# Patient Record
Sex: FEMALE | Race: WHITE | Employment: OTHER | URBAN - METROPOLITAN AREA
[De-identification: names, ages, dates, MRNs, and addresses within clinical notes are randomized per-mention and may not be internally consistent; named-entity substitution may affect disease eponyms.]

---

## 2018-10-29 PROBLEM — N95.2 VAGINAL ATROPHY: Status: ACTIVE | Noted: 2018-10-29

## 2018-10-29 PROBLEM — N36.2 URETHRAL CARUNCLE: Status: ACTIVE | Noted: 2018-10-29

## 2020-03-18 PROBLEM — R00.2 PALPITATIONS: Status: ACTIVE | Noted: 2020-03-18

## 2022-03-19 PROBLEM — N95.2 VAGINAL ATROPHY: Status: ACTIVE | Noted: 2018-10-29

## 2022-03-19 PROBLEM — N36.2 URETHRAL CARUNCLE: Status: ACTIVE | Noted: 2018-10-29

## 2022-03-19 PROBLEM — R00.2 PALPITATIONS: Status: ACTIVE | Noted: 2020-03-18

## 2022-03-24 ENCOUNTER — HOSPITAL ENCOUNTER (OUTPATIENT)
Dept: PHYSICAL THERAPY | Age: 77
Discharge: HOME | End: 2022-03-24
Attending: NURSE PRACTITIONER
Payer: MEDICARE

## 2022-03-24 DIAGNOSIS — N81.6 RECTOCELE: ICD-10-CM

## 2022-03-24 DIAGNOSIS — N81.10 FEMALE CYSTOCELE: ICD-10-CM

## 2022-03-24 PROCEDURE — 97162 PT EVAL MOD COMPLEX 30 MIN: CPT

## 2022-03-24 PROCEDURE — 97530 THERAPEUTIC ACTIVITIES: CPT

## 2022-03-24 PROCEDURE — 97110 THERAPEUTIC EXERCISES: CPT

## 2022-03-24 NOTE — PROGRESS NOTES
Lisa Burk  : 1945  [place]  [place]: [place] [address] at [place] [id number] [facility], [address] [place] [address],[address], [address] [facility]  [phone]   [phone]         OUTPATIENT PHYSICAL THERAPY: Daily Treatment Note 3/24/2022   Visit Count:  1  ICD-10: Treatment Diagnosis: Lack of coordination (muscle incoordination) (R27.8), Pelvic Muscle Wasting (N81.84), Cystocele, unspecified (Prolapse of (anterior) vaginal wall NOS) (N81.10) and Rectocele (Prolapse of posterior vaginal wall) (N81.6)  Precautions/Allergies:   Demerol [meperidine]   TREATMENT PLAN:  Effective Dates: 3/24/2022 TO 2022 (90 days). Frequency/Duration: 1 time a week for 90 Day(s)  Pre-treatment Symptoms/Complaints:    1. Rectocele > Cystocele - UFI, UUI  2. Right sided lower abdominal discomfort  3. Left posterior hip pain (intermittent). Previously treated. Pain: Initial:   3/10 Post Session:  3/10   Medications Last Reviewed:  3/24/2022  Updated Objective Findings:  See evaluation note from today   TREATMENT:   THERAPEUTIC EXERCISE: (10 minutes):  Exercises per grid below to improve strength and coordination. Required minimal verbal cues to promote proper body mechanics and promote proper body breathing techniques. Progressed repetitions and complexity of movement as indicated.   TE= therapeutic exercise, TA= therapeutic activity, MT= manual therapy, NE= neuro re-education   Date:  3/24/22 Date:   Date:     Activity/Exercise Parameters Parameters Parameters   Kegals 3x/day x 10 reps  3 sec holds in SUPINE     Pelvic brace 3x/day x 10   Supine             THERAPEUTIC ACTIVITY: (20minutes): Functional activity education regarding anatomy, pathology and role of pelvic floor muscle (PFM) function in relation to presenting symptoms and role of pelvic floor therapy in conservative treatment., Extensive functional activity training on avoiding valsava/breath holding during strenuous activities as well as abdominal/pelvic bracing to provide muscular support and decrease symptoms of prolapse during lifting, sit to stand, getting in/out of car as well as log roll technique to decrease intraabdominal pressure. and Instruction on coordinated pelvic floor and diaphragmatic breathing to improve kinesthetic awareness of pelvic muscle mobility and restore proper motor recruitment patterns with breathing, posture, and functional movement (e.g. appropriate lift/contraction with increased IAP such as a cough, laugh, sneeze to prevent incontinence as well as appropriate relaxation/drop to reduce pain with vaginal penetration). TA Educational Topic Notes Date Completed   Pathology/Anatomy/PFM Function Completed 3/24/22   Bladder health education     Urinary urge suppression     The joaquim     Voiding strategies     Bowel/Bladder log     Bowel health education     Constipation care     Diarrhea/Fecal leakage     Colonic massage     Toilet positioning     Defecation dynamics     Sources of fiber     Return to intercourse/Dilator training     Sexual positioning     Lubricants/vaginal moisturizers     Vaginal irritants     Body mechanics     Posture/ergonomics     Diaphragmatic breathing     Resources and technology       Pt gives verbal consent to internal vaginal assessment/treatment without chaperon present. Treatment/Session Summary:    · Response to Treatment:  Pt reports good understanding of plan of care, as well as prescribed home exercise program.  All questions were answered to pt's satisfaction. Pt was invited to call with any further questions or concerns. · Communication/Consultation:  None today  · Equipment provided today:  None today  · Recommendations/Intent for next treatment session: Next visit will focus on   1. Review pelvic brace - abdominal recruitment strategy        2. Review kegals with sEMG biofeedback. 3 sec holds. Add quick flicks to HEP (continue in supine).         3. Bowel/bladder health, edu re toileting position, bladder irritants        4. Hip strength and ROM screen. · Variation from POC: N/A  Total Treatment Billable Duration: Evaluation 30 minutes, treatment 30 minutes     Steven Quezada, PT     No future appointments.

## 2022-03-24 NOTE — THERAPY EVALUATION
Lakhwinder Buchanan  : 1945  [place]  [place] [address] at [facility] [place] [address] [facility] [address], [address], [address] [facility]  [phone]   [phone]        OUTPATIENT PHYSICAL THERAPY:Initial Assessment 3/24/2022   ICD-10: Treatment Diagnosis: Lack of coordination (muscle incoordination) (R27.8), Pelvic Muscle Wasting (N81.84), Cystocele, unspecified (Prolapse of (anterior) vaginal wall NOS) (N81.10) and Rectocele (Prolapse of posterior vaginal wall) (N81.6)  Precautions/Allergies:   Demerol [meperidine]   TREATMENT PLAN:  Effective Dates: 3/24/2022 TO 2022 (90 days). Frequency/Duration: 1 time a week for 90 Day(s) MEDICAL/REFERRING DIAGNOSIS:  Rectocele [N81.6]  Female cystocele [N81.10]   DATE OF ONSET: Unknown  REFERRING PHYSICIAN: Yandel Morataya NP MD Orders: Evaluate and treat  Return MD Appointment:      INITIAL ASSESSMENT: Lakhwinder Buchanan presents with musculoskeletal limitations including altered body mechanics, reduced coordination and awareness of PFM, poor abdominal strength and decreased pelvic floor muscle (PFM) strength. These limitations are impairing the patient's ability to properly coordinate perineal elevation and descent for normalized PFM function, contributing to urinary dysfunction including Urge Urinary Incontinence (UUI) and bowel dysfunction including urge Fecal Incontinence (FI), as we as lower abdominal pain. As a result, she is limited in her/his ability to participate in physical activity, complete household chores, and participate in social activities outside of the home without fear of FI, UUI, or abdominal discomfort related to the prolapse. PROBLEM LIST (Impacting functional limitations):  1. Decreased Strength  2. Decreased ADL/Functional Activities  3. Increased Pain  4. Decreased Activity Tolerance  5. Decreased [place] with Home Exercise Program  6.  Decreased strength of pelvic floor which limits bladder control INTERVENTIONS PLANNED: (Treatment may consist of any combination of the following)  1. Neuromuscular re-education  2. Biofeedback as needed  3. HEP  4. Bladder retraining  5. Bladder education  6. Manual Therapy  7. Range of Motion (ROM)  8. Therapeutic Activites  9. Therapeutic Exercise/Strengthening     GOALS: (Goals have been discussed and agreed upon with patient.)  Short-Term Functional Goals: Time Frame:4-6 weeks  1. Patient will demonstrate independence with basic pelvic floor muscle (PFM) home exercises program (HEP) to improve awareness, coordination, and timing of PFM. 2. Patient will demonstrate understanding of and ability to teach back appropriate water intake, bladder irritants, toileting frequency, and positioning for improved self-management of symptoms. 3. Patient will demonstrate ability to isolate a pelvic floor contraction without breath holding and minimal to no accessory muscle use in order to implement the knack and/or urge suppression, reducing pad usage by 100%. Discharge Goals: Time Frame: 8 weeks  1. Patient will demonstrate ability to increase intra-abdominal pressure and eccentrically contract the pelvic floor muscles without breath holding, as assessed by digitally or with use of sEMG biofeedback, in order to improve bowel evacuation. 2. Patient will report a reduction in episodes of FI by 100%. 3. Patient will improve outcome score by >12%.   4. Patient will be able to perform lifting, bending, transitioning without urinary leakage for improved participation in recreational activities and ADL's.     OUTCOME MEASURE:   Tool Used: Pelvic Floor Impact Questionnaireshort form 7 (PFIQ-7)   Score:  Initial: 3/24/22  · Bladder or Urine: 0/100  · Bowel or Rectum: 29/100  · Vagina or Pelvis: 0/100 Most Recent: X (Date: -- )  · Bladder or Urine: X  · Bowel or Rectum: X  · Vagina or Pelvis: X   Interpretation of Score: Each of the 7 sections is scored on a scale from 0-3; 0 representing \"Not at all\", 3 representing \"Quite a bit\". The mean value is taken from all the answered items, then multiplied by 100 to obtain the scale score, ranging from 0-100. This process is repeated for each column representing bowel, bladder, and pelvic pain. MEDICAL NECESSITY:   · Patient is expected to demonstrate progress in strength, coordination and functional technique to assist in prolapse management and minimize UI and FI. Clotgerardo Gray REASON FOR SERVICES/OTHER COMMENTS:  · Patient continues to require skilled intervention due to the above mentioned deficits. Total Duration: 60 minutes  PT Patient Time In/Time Out  Time In: 0900  Time Out: 1000    Rehabilitation Potential For Stated Goals: Excellent  Regarding Priscila Flannery's therapy, I certify that the treatment plan above will be carried out by a therapist or under their direction. Thank you for this referral,  Gumaro Sears PT     Referring Physician Signature: Alpesh Gibbs NP _______________________________ Date _____________     PAIN/SUBJECTIVE:   Initial: Pain Intensity 1: 3 3/10 Post Session:  3/10   HISTORY:   History of Injury/Illness (Reason for Referral):  Ms. Verito Tuttle is a 77 yo female referred to pelvic floor physical therapy (PFPT) by Alpesh Gibbs NP 2/2 due to rectocele and cystocele. Pt noticed prolapse with insertion of estrace she felt some resistance present. Pt decided to see the gynecologist due to abdominal pain. She reports the abdominal pain has been present for several months. Pain is described as dull ache and pressure. Pain is greatest on her right side. Pain is intermittent. She thinks it may be associated with constipation or need to have a bowel movement, but is not certain there is a direct relationship. Pt denies LBP unless she \"overexercsises\" with gardening. Pt also reports right inguinal pain. States she notices catch with standing-up and she has difficulty walking following.      Pt has osteoporosis. Urinary: Frequency 5-8 x/day, 1 x/night. Positive for urge urinary incontinence. Altered urinary stream due to urethra caruncle. Negative for stress urinary incontinence. Pt does use pads for Bowel protection if needed. Fluid intake:  3-4 glasses oz water/day; bladder irritants include: unsweet tea (2), hot tea. Pt does not limit fluid intake due to bladder control. Bowel: Frequency 3x. Positive for intermttent straining to have BM. This is rare now. Positive for FI. States she has had loose stools for several years. Pt does use pads for bowel protection; 1 pads per day (PPD) when in public or traveling due to uncertainty of ability to control BM's. [place] stool type: 2, 4 (variable). Use of stool softeners or laxatives? NO    Sexual: Pt is sexually active with male partners. negative for dyspareunia. Denies pain with sexual intercourse. OB History: Number of pregnancies: 3 Number of vaginal deliveries: 3. Pt had an episiotomy that extended to the rectum. States this took a long time to heal and since she has felt her bowel control has been less. Patient stated goals for PT:  Patients goal(s) for PT are \"prevent further prolapse\"    Past Medical History/Comorbidities:   Ms. Debora Nielsen  has a past medical history of High cholesterol, Hypertension, and Osteoporosis. Ms. Debora Nielsen  has a past surgical history that includes hx hysteroscopy with endometrial ablation (1998); hx myomectomy (1998); hx wisdom teeth extraction; hx carpal tunnel release (2008); and hx tubal ligation. Social History/Living Environment:   Have you ever had any pelvic trauma (orthopedic in nature, fall, MVA, etc.)? NO   Have you ever experienced any unwanted physical or sexual contact? NO   Have you ever experienced any form of medical trauma (GYN, urological, GI, etc)? NO     Pt lives with . Alcohol use? rare  Tobacco use?  Non-smoker    Prior Level of Function/Work/Activity:  Prior level of function: WFL  Occupation: Retired  Exercise activities: Walking 1 hour every morning. No current strengthening program.     Personal Factors:          Age:  78 y.o. Chronic left hip pain      Risk Factors:       No Risk Factors Identified       (5)  History of Recent Falls [w/in 3 months] Ability to Rise from Chair:       (0)  Ability to rise in a single movement   Falls Prevention Plan:       No modifications necessary   Total: (5 or greater = High Risk): 1   ©2010 [facility] [employer]. All Rights Reserved. [facility] Patent #[id number]. Federal Law prohibits the replication, distribution or use without written permission from [facility] [employer]   Current Medications:       Current Outpatient Medications:     estradioL (ESTRACE) 0.01 % (0.1 mg/gram) vaginal cream, Apply 1 g to affected area every Monday, Wednesday, Friday., Disp: 42.5 g, Rfl: 12    metoprolol succinate (TOPROL-XL) 50 mg XL tablet, Take 1 Tablet by mouth daily. , Disp: 90 Tablet, Rfl: 3    FLUoxetine (PROZAC) 10 mg capsule, Take 10 mg by mouth daily. , Disp: , Rfl:     simvastatin (ZOCOR) 10 mg tablet, Take  by mouth nightly., Disp: , Rfl:     aspirin 81 mg chewable tablet, Take 81 mg by mouth daily. , Disp: , Rfl:     cholecalciferol, vitamin D3, (VITAMIN D3) 2,000 unit tab, Take  by mouth., Disp: , Rfl:    Date Last Reviewed: 3/24/2022   Number of Personal Factors/Comorbidities that affect the Plan of Care: 1-2: MODERATE COMPLEXITY   EXAMINATION:   External Observations:   Voluntary contraction: [] absent     [x] present   Involuntary contraction: [] absent     [x] present   Involuntary relaxation: [] absent     [x] present   Perineal descent at rest: [x] absent     [] present   Perineal descent with bearing: [] absent     [x] present, to level of hymen (assessed in supine)    Pelvic Floor Muscle (PFM) Assessment:   Vaginal vault size: [] decreased     [] increased     [x] WNL   Muscle volume: [] decreased     [] WNL [x] Defect   PFM tension: [x] decreased     [] increased     [] WNL    Contraction ability:  Voluntary contraction: [] absent     [x] weak     [] moderate      [] strong  Voluntary relaxation: [] absent     [x] partial     [] complete   MMT: 2/5   PFM endurance: 5 seconds  Repetitions of endurance contractions: 3  Number of quick contractions in 10 seconds: 5  Quality of contractions: Fair    Tissue laxity test:  Anterior wall: [x] minimum     [] moderate     [] severe      [] WNL  Posterior wall: [x] minimum     [] moderate     [] severe      [] WNL    Palpation: via vaginal canal   Superficial Pelvic Floor Musculature (PFM): Tender? Intermediate PFM Tender? Deep PFM Tender? Superficial Transverse Perineal [] Right  [] Left  []DNT Deep Transverse Perineal [] Right  [] Left  []DNT Puborectalis [] Right  [] Left  []DNT   Ischiocavernosus [] Right  [] Left  []DNT Compressor Urethra [] Right  [] Left  []DNT Pubococcygeus [] Right  [] Left  []DNT   Bulbocavernosus [] Right  [] Left  []DNT   Iliococcygeus [] Right  [] Left  []DNT       Obturator Internus [x] Right  [x] Left  []DNT       Coccygeus [] Right  [] Left  []DNT     Hip Strength: To be assessed at follow-up    Range of motion:   Left Right   Hip flexion     Hip extension      Knee flexion      Knee extension     Hip internal rotation      Hip external rotation      Hip abduction     Hip adduction          External palpation:  Muscle/muscle group Tender? Adductors [] Right  [] Left  []DNT   Gluteals [] Right  [] Left  []DNT   Piriformis [] Right  [] Left  []DNT   Iliopsoas [] Right  [] Left  []DNT   Abdominal wall [] Right  [x] Left  []DNT   Pubic symphysis [] Right  [] Left  []DNT     Breath assessment:   Observation: chest breathing dominant  Coordination of pelvic floor muscles with breath: minimal pelvic floor muscle excursion  Co-contraction of PFM with transversus abdominis: absent. Excessive use of RA, resulting in increase in IAP.  Pt unable to maintain PFM contraction with this recruitment strategy. Body Structures Involved:  1. Nerves  2. Joints  3. Muscles Body Functions Affected:  1. Sensory/Pain  2. Genitourinary  3. Neuromusculoskeletal  4. Movement Related Activities and Participation Affected:  1. Self Care  2.  Domestic Life   Number of elements (examined above) that affect the Plan of Care: 3: MODERATE COMPLEXITY   CLINICAL PRESENTATION:   Presentation: Evolving clinical presentation with changing clinical characteristics: MODERATE COMPLEXITY   CLINICAL DECISION MAKING:   Use of outcome tool(s) and clinical judgement create a POC that gives a: Questionable prediction of patient's progress: MODERATE COMPLEXITY     Eladio Dior PT, DPT, [facility]

## 2022-04-04 ENCOUNTER — HOSPITAL ENCOUNTER (OUTPATIENT)
Dept: PHYSICAL THERAPY | Age: 77
Discharge: HOME | End: 2022-04-04
Attending: NURSE PRACTITIONER
Payer: MEDICARE

## 2022-04-04 PROCEDURE — 97110 THERAPEUTIC EXERCISES: CPT

## 2022-04-04 PROCEDURE — 97530 THERAPEUTIC ACTIVITIES: CPT

## 2022-04-04 NOTE — PROGRESS NOTES
Colen Shone  : 1945  [place]  [place]: [place] [address] at [place] [id number] [facility], [address] [place] [address],[address], [address]  [phone]   [phone]         OUTPATIENT PHYSICAL THERAPY: Daily Treatment Note 2022   Visit Count:  2  ICD-10: Treatment Diagnosis: Lack of coordination (muscle incoordination) (R27.8), Pelvic Muscle Wasting (N81.84), Cystocele, unspecified (Prolapse of (anterior) vaginal wall NOS) (N81.10) and Rectocele (Prolapse of posterior vaginal wall) (N81.6)  Precautions/Allergies:   Demerol [meperidine]   TREATMENT PLAN:  Effective Dates: 3/24/2022 TO 2022 (90 days). Frequency/Duration: 1 time a week for 90 Day(s)  Pre-treatment Symptoms/Complaints:    1. Rectocele > Cystocele - UFI, UUI  2. Right sided lower abdominal discomfort  3. Left posterior hip pain (intermittent). Previously treated. Hip: Pt experiencing left posterior hip pain (piriformis). States she used a tennis ball to roll this out and it helped. Less pain today. Abdominal: Pt will see GI MD on Friday. Denies pelvic heaviness/pressure. Pain: Initial:   0/10 Post Session:  0/10   Medications Last Reviewed:  2022  Updated Objective Findings:    sEMG biofeedback:  Side-lying resting PFM activity initially at 5-7 mV with max PFM recruitment at 50 mV. Resting reduced to 3 mV with cues to relax gluteals and abdominals. Quick flicks well coordinated to 40 mV. No delay in relaxation between contractions. Max hold time 3-4 seconds. TREATMENT:   THERAPEUTIC EXERCISE: (30 minutes):  Exercises per grid below to improve strength and coordination. Required minimal verbal cues to promote proper body mechanics and promote proper body breathing techniques. Progressed repetitions and complexity of movement as indicated.   TE= therapeutic exercise, TA= therapeutic activity, MT= manual therapy, NE= neuro re-education   Date:  3/24/22 Date:  4/4/22 Date:     Activity/Exercise Parameters Parameters Parameters   Kegals 3x/day x 10 reps  3 sec holds in SUPINE With sEMG biofeedback:    QF's 4 x 5 reps  Holds 3-4 seconds    Pelvic brace 3x/day x 10   Supine X 10 in supine, hook-lying  X 5 with march  X 5 with march + hip adduction    PF drops   x 10 with sEMG biofeedback    HEP  Updated to add 4x5 QF and Brace + march in place      THERAPEUTIC ACTIVITY: (15 minutes): Functional activity education regarding anatomy, pathology and role of pelvic floor muscle (PFM) function in relation to presenting symptoms and role of pelvic floor therapy in conservative treatment., Extensive functional activity training on avoiding valsava/breath holding during strenuous activities as well as abdominal/pelvic bracing to provide muscular support and decrease symptoms of prolapse during lifting, sit to stand, getting in/out of car as well as log roll technique to decrease intraabdominal pressure. and Instruction on coordinated pelvic floor and diaphragmatic breathing to improve kinesthetic awareness of pelvic muscle mobility and restore proper motor recruitment patterns with breathing, posture, and functional movement (e.g. appropriate lift/contraction with increased IAP such as a cough, laugh, sneeze to prevent incontinence as well as appropriate relaxation/drop to reduce pain with vaginal penetration).      TA Educational Topic Notes Date Completed   Pathology/Anatomy/PFM Function Completed 3/24/22   Bladder health education     Urinary urge suppression Urge and bowel suppression strategies 4/4/22   The knack     Voiding strategies     Bowel/Bladder log     Bowel health education     Constipation care     Diarrhea/Fecal leakage     Colonic massage     Toilet positioning     Defecation dynamics     Sources of fiber     Return to intercourse/Dilator training     Sexual positioning     Lubricants/vaginal moisturizers     Vaginal irritants     Body mechanics Body mechanics, coordination of PF, TrA, and diaphragm 4/4/22   Posture/ergonomics     Diaphragmatic breathing     Resources and technology       Pt gives verbal consent to internal vaginal assessment/treatment without chaperon present. Treatment/Session Summary:    · Response to Treatment:  Pt reports good understanding of plan of care, as well as prescribed home exercise program.  All questions were answered to pt's satisfaction. Pt was invited to call with any further questions or concerns. · Communication/Consultation:  None today  · Equipment provided today:  None today  · Recommendations/Intent for next treatment session: Next visit will focus on manual therapy left piriformis. 1. Review pelvic brace - abdominal recruitment strategy - progress to seated, standing        2. Review kegals with sEMG biofeedback. 3 sec holds - progress to 5 sec holds?, QF in seated, standing        3. Bowel/bladder health, edu re toileting position, bladder irritants        4. Hip strength and ROM screen.    · Variation from POC: N/A  Total Treatment Billable Duration: Treatment minutes [address], PT     Future Appointments   Date Time Provider Samuel Alvarez   4/11/2022  9:00 AM Yusef Jensen PT [facility] KALPANA   4/19/2022  2:00 PM Yusef Jensen PT [facility] SFLEILA   4/25/2022  1:00 PM Yusef Jensen PT CARA ACUNA

## 2022-04-12 ENCOUNTER — HOSPITAL ENCOUNTER (OUTPATIENT)
Dept: PHYSICAL THERAPY | Age: 77
Discharge: HOME | End: 2022-04-12
Attending: NURSE PRACTITIONER
Payer: MEDICARE

## 2022-04-12 PROCEDURE — 97110 THERAPEUTIC EXERCISES: CPT

## 2022-04-12 PROCEDURE — 97140 MANUAL THERAPY 1/> REGIONS: CPT

## 2022-04-12 NOTE — PROGRESS NOTES
Charo Glasgow  : 1945  [place]  [place]: [place] [address] at [place] [address] [facility], [address] [place] [address],[address], [address]  [phone]   [phone]         OUTPATIENT PHYSICAL THERAPY: Daily Treatment Note 2022   Visit Count:  3  ICD-10: Treatment Diagnosis: Lack of coordination (muscle incoordination) (R27.8), Pelvic Muscle Wasting (N81.84), Cystocele, unspecified (Prolapse of (anterior) vaginal wall NOS) (N81.10) and Rectocele (Prolapse of posterior vaginal wall) (N81.6)  Precautions/Allergies:   Demerol [meperidine]   TREATMENT PLAN:  Effective Dates: 3/24/2022 TO 2022 (90 days). Frequency/Duration: 1 time a week for 90 Day(s)  Pre-treatment Symptoms/Complaints:    1. Rectocele > Cystocele - UFI, UUI  2. Right sided lower abdominal discomfort  3. Left posterior hip pain (intermittent). Previously treated. Pt saw GI last week. She has an appointment for a colonoscopy and Endoscopy. He would like her to follow an IBS specific diet. Avoiding raw vegetables. Abdominal pain: less, not as frequent. No issues with bowel movements. Left hip pain: no pain today. Pt states if she walks daily her hip pain is less. Sometimes she notices breath holding with completion of exercises. Pain: Initial:   0/10 Post Session:  0/10   Medications Last Reviewed:  2022  Updated Objective Findings:    sEMG biofeedback:  Side-lying resting PFM activity initially at 5-7 mV with max PFM recruitment at 50 mV. Resting reduced to 3 mV with cues to relax gluteals and abdominals. Quick flicks well coordinated to 40 mV in supine. Quick flicks to <1WE in seated position. No delay in relaxation between contractions. Max hold time 5-7 seconds. TREATMENT:   THERAPEUTIC EXERCISE: (45 minutes):  Exercises per grid below to improve strength and coordination.   Required minimal verbal cues to promote proper body mechanics and promote proper body breathing techniques. Progressed repetitions and complexity of movement as indicated. TE= therapeutic exercise, TA= therapeutic activity, MT= manual therapy, NE= neuro re-education   Date:  3/24/22 Date:  4/4/22 Date:  4/12/22   Activity/Exercise Parameters Parameters Parameters   Kegals 3x/day x 10 reps  3 sec holds in SUPINE With sEMG biofeedback:    QF's 4 x 5 reps  Holds 3-4 seconds With sEMG biofeedback:    QF's 4 x 5 reps in side-lying and seated. Holds 3 seconds x 10, 5 seconds x 10   Pelvic brace 3x/day x 10   Supine X 10 in supine, hook-lying  X 5 with march  X 5 with march + hip adduction 2 X 10 in supine, hook-lying  + hip adduction ( BLOCK) up/up/down/down     PF drops   x 10 with sEMG biofeedback  x 10 with sEMG biofeedback   HEP  Updated to add 4x5 QF and Brace + march in place Progressed to QF in seated position, 5 sec PF holds in supine/side-lying   Side-lying hip abduction   2 x 10 against wall     Access Code: [email or web address]  URL: [email or web address]  Date: 04/12/2022  Prepared by: [facility] OF [facility]    Exercises  Sidelying Hip Abduction at [employer] - 1 x daily - 7 x weekly - 2 sets - 10 reps  Supine March with Elevation - 1 x daily - 7 x weekly - 2 sets - 10 reps    THERAPEUTIC ACTIVITY: ( minutes): Functional activity education regarding anatomy, pathology and role of pelvic floor muscle (PFM) function in relation to presenting symptoms and role of pelvic floor therapy in conservative treatment., Extensive functional activity training on avoiding valsava/breath holding during strenuous activities as well as abdominal/pelvic bracing to provide muscular support and decrease symptoms of prolapse during lifting, sit to stand, getting in/out of car as well as log roll technique to decrease intraabdominal pressure.  and Instruction on coordinated pelvic floor and diaphragmatic breathing to improve kinesthetic awareness of pelvic muscle mobility and restore proper motor recruitment patterns with breathing, posture, and functional movement (e.g. appropriate lift/contraction with increased IAP such as a cough, laugh, sneeze to prevent incontinence as well as appropriate relaxation/drop to reduce pain with vaginal penetration). TA Educational Topic Notes Date Completed   Pathology/Anatomy/PFM Function Completed 3/24/22   Bladder health education     Urinary urge suppression Urge and bowel suppression strategies 4/4/22   The knack     Voiding strategies     Bowel/Bladder log     Bowel health education     Constipation care     Diarrhea/Fecal leakage     Colonic massage     Toilet positioning     Defecation dynamics     Sources of fiber     Return to intercourse/Dilator training     Sexual positioning     Lubricants/vaginal moisturizers     Vaginal irritants     Body mechanics Body mechanics, coordination of PF, TrA, and diaphragm 4/4/22   Posture/ergonomics     Diaphragmatic breathing     Resources and technology       Pt gives verbal consent to internal vaginal assessment/treatment without chaperon present. Did not complete today. MANUAL THERAPY (10 minutes): STM lateral sacral border to lateral greater trochanter. Piriformis release. Treatment/Session Summary:    · Response to Treatment: Pt showing improved ability to hold PFM contraction. Pt exhibited difficulty with maintaining TrA activation during supine march as she exhibited pelvic rotation and lumbar extension. Mild discomfort with palpation at left lateral border of sacrum. · Communication/Consultation:  None today  · Equipment provided today:  None today  · Recommendations/Intent for next treatment session: Next visit will focus on manual therapy left piriformis. 1. Review TrA activation in supine with march/block, progress to quadruped        2. Review kegals with sEMG biofeedback. 3-5 sec holds in seated, standing, review QF in seated, standing        3.  Bowel/bladder health, edu re toileting position, bladder irritants 4. Hip strength and ROM screen.     · Variation from POC: N/A  Total Treatment Billable Duration: Treatment minutes 269 [facility], PT     Future Appointments   Date Time Provider Samuel Alvarez   4/19/2022  2:00 PM Mau Mccord [facility] [place]   4/25/2022  1:00 PM Mau Mccord, PT [facility]

## 2022-04-18 ENCOUNTER — APPOINTMENT (OUTPATIENT)
Dept: PHYSICAL THERAPY | Age: 77
End: 2022-04-18
Attending: NURSE PRACTITIONER
Payer: MEDICARE

## 2022-04-19 ENCOUNTER — HOSPITAL ENCOUNTER (OUTPATIENT)
Dept: PHYSICAL THERAPY | Age: 77
Discharge: HOME | End: 2022-04-19
Attending: NURSE PRACTITIONER
Payer: MEDICARE

## 2022-04-19 PROCEDURE — 97110 THERAPEUTIC EXERCISES: CPT

## 2022-04-19 NOTE — PROGRESS NOTES
Bacilio Rosenberg  : 1945  [place]  [place]: [place] [address] at [address], [address] [place] [address],[address], [address] [facility]  [phone]   [phone]         OUTPATIENT PHYSICAL THERAPY: Daily Treatment Note 2022   Visit Count:  4  ICD-10: Treatment Diagnosis: Lack of coordination (muscle incoordination) (R27.8), Pelvic Muscle Wasting (N81.84), Cystocele, unspecified (Prolapse of (anterior) vaginal wall NOS) (N81.10) and Rectocele (Prolapse of posterior vaginal wall) (N81.6)  Precautions/Allergies:   Demerol [meperidine]   TREATMENT PLAN:  Effective Dates: 3/24/2022 TO 2022 (90 days). Frequency/Duration: 1 time a week for 90 Day(s)  Pre-treatment Symptoms/Complaints:    1. Rectocele > Cystocele - UFI, UUI  2. Right sided lower abdominal discomfort  3. Left posterior hip pain (intermittent). Previously treated. Pt states she did well in the car yesterday. Left sided pelvic girdle pain rated as 3/10 currently. Denies abdominal pain currently. No report of incontinence    Less consistent with her exercises in last week. Pain: Initial:   0/10 Post Session:  0/10   Medications Last Reviewed:  2022  Updated Objective Findings:    sEMG biofeedback:  Side-lying resting PFM activity initially at <2.0 mV. Endurance holds x 5 sec at 20-30 mV in side-lying. Quick flicks to 12-63 mV in side-lying. 5-7mV x 5 seconds in seated and 7-10 mV in standing. Pt fatigued in standing. TREATMENT:   THERAPEUTIC EXERCISE: (55 minutes):  Exercises per grid below to improve strength and coordination. Required minimal verbal cues to promote proper body mechanics and promote proper body breathing techniques. Progressed repetitions and complexity of movement as indicated.     TE= therapeutic exercise, TA= therapeutic activity, MT= manual therapy, NE= neuro re-education   Date:  3/24/22 Date:  22 Date:  22 Date:  22 Date:     Activity/Exercise Parameters Parameters Parameters Parameters Parameters   Kegals 3x/day x 10 reps  3 sec holds in SUPINE With sEMG biofeedback:    QF's 4 x 5 reps  Holds 3-4 seconds With sEMG biofeedback:    QF's 4 x 5 reps in side-lying and seated. Holds 3 seconds x 10, 5 seconds x 10 With sEMG biofeedback:    QF's 4 x 5 reps in side-lying, seated, standing    Holds 5 sec seconds x 10 in seated, side-lying,  Standing      Pelvic brace 3x/day x 10   Supine X 10 in supine, hook-lying  X 5 with march  X 5 with march + hip adduction 2 X 10 in supine, hook-lying  + hip adduction ( BLOCK) up/up/down/down   Supine 3 x 10      ( BLOCK removed) up/up/down/down    PF drops   x 10 with sEMG biofeedback  x 10 with sEMG biofeedback X 10 with sEMG biofeedback    HEP  Updated to add 4x5 QF and Brace + march in place Progressed to QF in seated position, 5 sec PF holds in supine/side-lying Progressed  Holds in supine x 5-7 sec, seated/standing x 3 sec x 10 reps  X 10 QF in each position    Side-lying hip abduction   2 x 10 against wall     Standing balance     Single leg balance     Access Code: [email or web address]  URL: [email or web address]  Date: 04/12/2022  Prepared by: [facility]    Exercises  Sidelying Hip Abduction at [facility] - 1 x daily - 7 x weekly - 2 sets - 10 reps  Supine March with Elevation - 1 x daily - 7 x weekly - 2 sets - 10 reps    THERAPEUTIC ACTIVITY: ( minutes): Functional activity education regarding anatomy, pathology and role of pelvic floor muscle (PFM) function in relation to presenting symptoms and role of pelvic floor therapy in conservative treatment., Extensive functional activity training on avoiding valsava/breath holding during strenuous activities as well as abdominal/pelvic bracing to provide muscular support and decrease symptoms of prolapse during lifting, sit to stand, getting in/out of car as well as log roll technique to decrease intraabdominal pressure.  and Instruction on coordinated pelvic floor and diaphragmatic breathing to improve kinesthetic awareness of pelvic muscle mobility and restore proper motor recruitment patterns with breathing, posture, and functional movement (e.g. appropriate lift/contraction with increased IAP such as a cough, laugh, sneeze to prevent incontinence as well as appropriate relaxation/drop to reduce pain with vaginal penetration). TA Educational Topic Notes Date Completed   Pathology/Anatomy/PFM Function Completed 3/24/22   Bladder health education     Urinary urge suppression Urge and bowel suppression strategies 4/4/22   The joaquim     Voiding strategies     Bowel/Bladder log     Bowel health education     Constipation care     Diarrhea/Fecal leakage     Colonic massage     Toilet positioning     Defecation dynamics     Sources of fiber     Return to intercourse/Dilator training     Sexual positioning     Lubricants/vaginal moisturizers     Vaginal irritants     Body mechanics Body mechanics, coordination of PF, TrA, and diaphragm 4/4/22   Posture/ergonomics     Diaphragmatic breathing     Resources and technology       Pt gives verbal consent to internal vaginal assessment/treatment without chaperon present. Did not complete today. MANUAL THERAPY (0 minutes): STM lateral sacral border to lateral greater trochanter. Piriformis release (Left) - held today    Treatment/Session Summary:    · Response to Treatment: Pt with improved resting PFM activity in all positions. Pt exhibited excessive use of gluteals and increased challenge performing kegals in standing. She continues to benefit from TrA training and strengthening. · Communication/Consultation:  None today  · Equipment provided today:  None today  · Recommendations/Intent for next treatment session: Next visit will focus on  Urge suppression handout,   1. Review TrA activation in supine with march/block, progress to quadruped        2.  Review kegals with sEMG biofeedback - endurance in standing.          3. Bowel/bladder health, edu re toileting position, bladder irritants    · Variation from POC: N/A  Total Treatment Billable Duration: Treatment minutes 269 [facility], PT     Future Appointments   Date Time Provider Samuel Alvarez   4/19/2022  2:00 PM Jazmine Porter [facility] [place]   4/25/2022  1:00 PM [facility] [place] [facility]

## 2022-04-25 ENCOUNTER — HOSPITAL ENCOUNTER (OUTPATIENT)
Dept: PHYSICAL THERAPY | Age: 77
Discharge: HOME | End: 2022-04-25
Attending: NURSE PRACTITIONER
Payer: MEDICARE

## 2022-04-25 PROCEDURE — 97110 THERAPEUTIC EXERCISES: CPT

## 2022-04-25 PROCEDURE — 97140 MANUAL THERAPY 1/> REGIONS: CPT

## 2022-04-25 NOTE — PROGRESS NOTES
Abigail Lizama  : 1945  [place]  [place]: [place] [address] [facility] at [place] [id number] [facility], [address] [place] [address],[address], [address]  [phone]   [phone]         OUTPATIENT PHYSICAL THERAPY: Daily Treatment Note 2022   Visit Count:  5  ICD-10: Treatment Diagnosis: Lack of coordination (muscle incoordination) (R27.8), Pelvic Muscle Wasting (N81.84), Cystocele, unspecified (Prolapse of (anterior) vaginal wall NOS) (N81.10) and Rectocele (Prolapse of posterior vaginal wall) (N81.6)  Precautions/Allergies:   Demerol [meperidine]   TREATMENT PLAN:  Effective Dates: 3/24/2022 TO 2022 (90 days). Frequency/Duration: 1 time a week for 90 Day(s)  Pre-treatment Symptoms/Complaints:    1. Rectocele > Cystocele - UFI, UUI  2. Right sided lower abdominal discomfort  3. Left posterior hip pain (intermittent). Previously treated. Pt states she did well in the car yesterday. Left sided pelvic girdle pain rated as 3/10 currently. Denies abdominal pain currently. No report of incontinence    Less consistent with her exercises in last week. Variable stool consistency. She notices constipation followed by diarrhea. States she does not drink enough water    Pain: Initial: Pain Intensity 1: 2 /10  Posterior/hip back pain Post Session:  0/10   Medications Last Reviewed:  2022  Updated Objective Findings:    Internal PFM assessment: (via vaginal canal) 3/5. Mild delay in relaxation following contractions. Quick flicks: 4 in 10 seconds. TREATMENT:   THERAPEUTIC EXERCISE: (40 minutes):  Exercises per grid below to improve strength and coordination. Required minimal verbal cues to promote proper body mechanics and promote proper body breathing techniques. Progressed repetitions and complexity of movement as indicated.     TE= therapeutic exercise, TA= therapeutic activity, MT= manual therapy, NE= neuro re-education   Date:  3/24/22 Date:  4/4/22 Date:  4/12/22 Date:  4/19/22 Date:  4/25/22   Activity/Exercise Parameters Parameters Parameters Parameters Parameters   Kegals 3x/day x 10 reps  3 sec holds in SUPINE With sEMG biofeedback:    QF's 4 x 5 reps  Holds 3-4 seconds With sEMG biofeedback:    QF's 4 x 5 reps in side-lying and seated. Holds 3 seconds x 10, 5 seconds x 10 With sEMG biofeedback:    QF's 4 x 5 reps in side-lying, seated, standing    Holds 5 sec seconds x 10 in seated, side-lying,  Standing   With digital feedback:     QF's 4 x 5 reps in supine      Coordinated contractions with breath    Holds 5 seconds x 10 in supine   Pelvic brace 3x/day x 10   Supine X 10 in supine, hook-lying  X 5 with march  X 5 with march + hip adduction 2 X 10 in supine, hook-lying  + hip adduction ( BLOCK) up/up/down/down   Supine 3 x 10      ( BLOCK removed) up/up/down/down 3 x 5 - progressed to heel drop   PF drops   x 10 with sEMG biofeedback  x 10 with sEMG biofeedback X 10 with sEMG biofeedback    HEP  Updated to add 4x5 QF and Brace + march in place Progressed to QF in seated position, 5 sec PF holds in supine/side-lying Progressed  Holds in supine x 5-7 sec, seated/standing x 3 sec x 10 reps  X 10 QF in each position    Side-lying hip abduction   2 x 10 against wall  dropped   Standing balance     Single leg balance with hip abduction 1 x 10 YTB   Tall kneeling hip flexor stretch     3 x 30 sec   Cat/cow-->Debora pose     2 x 5 reps             Access Code: [email or web address]  URL: [email or web address]  Date: 04/25/2022  Prepared by:  [facility] OF [facility]    Exercises  Quadruped Cat Camel - 2 x daily - 7 x weekly - 2 sets - 5 reps  Child's Pose with Sidebending - 2 x daily - 7 x weekly - 2 sets - 5 reps  Half Kneeling Hip Flexor Stretch - 2 x daily - 7 x weekly - 3 sets - 30 hold  Supine 90/90 Alternating Toe Touch - 1 x daily - 7 x weekly - 3 sets - 5 reps  Hip Abduction with Resistance Loop - 1 x daily - 7 x weekly - 10 reps      THERAPEUTIC ACTIVITY: ( minutes): Functional activity education regarding anatomy, pathology and role of pelvic floor muscle (PFM) function in relation to presenting symptoms and role of pelvic floor therapy in conservative treatment., Extensive functional activity training on avoiding valsava/breath holding during strenuous activities as well as abdominal/pelvic bracing to provide muscular support and decrease symptoms of prolapse during lifting, sit to stand, getting in/out of car as well as log roll technique to decrease intraabdominal pressure. and Instruction on coordinated pelvic floor and diaphragmatic breathing to improve kinesthetic awareness of pelvic muscle mobility and restore proper motor recruitment patterns with breathing, posture, and functional movement (e.g. appropriate lift/contraction with increased IAP such as a cough, laugh, sneeze to prevent incontinence as well as appropriate relaxation/drop to reduce pain with vaginal penetration). TA Educational Topic Notes Date Completed   Pathology/Anatomy/PFM Function Completed 3/24/22   Bladder health education     Urinary urge suppression Urge and bowel suppression strategies 4/4/22 4/25/22   The joaquim     Voiding strategies     Bowel/Bladder log     Bowel health education     Constipation care     Diarrhea/Fecal leakage     Colonic massage     Toilet positioning     Defecation dynamics     Sources of fiber     Return to intercourse/Dilator training     Sexual positioning     Lubricants/vaginal moisturizers     Vaginal irritants     Body mechanics Body mechanics, coordination of PF, TrA, and diaphragm 4/4/22   Posture/ergonomics     Diaphragmatic breathing     Resources and technology       Pt gives verbal consent to internal vaginal assessment/treatment without chaperon present. MANUAL THERAPY (15 minutes): STM lateral sacral border to lateral greater trochanter.  Piriformis release (Left)     Treatment/Session Summary:    · Response to Treatment: Pt with improved PFM contraction from 2/5 to 3/5 since her initial evaluation. She is also seeing improved endurance and urinary control. She continues to require verbal instruction to relax completely between contractions and appropriate timing of her breath with activation. She also has significant tension through her erector spinae bilaterally and poor abdominal control. · Communication/Consultation:  None today  · Equipment provided today:  None today  Recommendations/Intent for next treatment session: Next visit will focus on review urge suppression, progress TrA to quadruped, continue lumbar mobility.  Quick flicks in all positions with sEMG biofeedback    · Variation from POC: N/A  Total Treatment Billable Duration: Treatment minutes 55  PT Patient Time In/Time Out  Time In: 0100  Time Out: 1790 [facility],      Future Appointments   Date Time Provider Samuel Alvarez   5/2/2022  1:00 PM China Emery PT [facility] [place]   5/9/2022  1:00 PM China Emery PT [facility] SFLEILA   5/16/2022  1:00 PM China Emery PT [facility] CARTERE

## 2022-05-02 ENCOUNTER — HOSPITAL ENCOUNTER (OUTPATIENT)
Dept: PHYSICAL THERAPY | Age: 77
Discharge: HOME | End: 2022-05-02
Attending: NURSE PRACTITIONER
Payer: MEDICARE

## 2022-05-02 ENCOUNTER — HOSPITAL ENCOUNTER (OUTPATIENT)
Dept: PHYSICAL THERAPY | Age: 77
Setting detail: RECURRING SERIES
Discharge: HOME | End: 2022-05-05
Payer: MEDICARE

## 2022-05-02 PROCEDURE — 97530 THERAPEUTIC ACTIVITIES: CPT

## 2022-05-02 PROCEDURE — 97110 THERAPEUTIC EXERCISES: CPT

## 2022-05-02 NOTE — PROGRESS NOTES
Alan Cohen  : 1945  [place]  [place]: [place] [address] at [place] [id number] [facility], [address] [place] [address],[address], [address]  [phone]   [phone]         OUTPATIENT PHYSICAL THERAPY: Daily Treatment Note 2022   Visit Count:  6  ICD-10: Treatment Diagnosis: Lack of coordination (muscle incoordination) (R27.8), Pelvic Muscle Wasting (N81.84), Cystocele, unspecified (Prolapse of (anterior) vaginal wall NOS) (N81.10) and Rectocele (Prolapse of posterior vaginal wall) (N81.6)  Precautions/Allergies:   Demerol [meperidine]   TREATMENT PLAN:  Effective Dates: 3/24/2022 TO 2022 (90 days). Frequency/Duration: 1 time a week for 90 Day(s)  Pre-treatment Symptoms/Complaints:    1. Rectocele > Cystocele - UFI, UUI  2. Right sided lower abdominal discomfort  3. Left posterior hip pain (intermittent). Previously treated. Urinary: Pt states she has difficulty coordinating PFM quick contractions. She tends to contract her abdominal muscles > pelvic floor muscles. Bowel: Denies issues  Lower right side abdominal pain, described as intermittent. Notices \"catching\" in right hip with transitions sit to stand. Pain: Initial:   2/10  Posterior/hip back pain Post Session:  0/10   Medications Last Reviewed:  2022  Updated Objective Findings:    Internal PFM assessment: (via vaginal canal) 3/5. Mild delay in relaxation following contractions. ()  Quick flicks: 4 in 10 seconds. Hip impingement testing:  FADIR: positive  LAINEY: negative  SLR: negative    Lumbar spine: No reproduction of anterior hip pain with flexion, extension, side-bending, or rotational movements. TREATMENT:   THERAPEUTIC EXERCISE: (40 minutes):  Exercises per grid below to improve strength and coordination. Required minimal verbal cues to promote proper body mechanics and promote proper body breathing techniques.   Progressed repetitions and complexity of movement as indicated. TE= therapeutic exercise, TA= therapeutic activity, MT= manual therapy, NE= neuro re-education   Date:  3/24/22 Date:  4/4/22 Date:  4/12/22 Date:  4/19/22 Date:  4/25/22 Date:  5/2/22   Activity/Exercise Parameters Parameters Parameters Parameters Parameters Parameters   Kegals 3x/day x 10 reps  3 sec holds in SUPINE With sEMG biofeedback:    QF's 4 x 5 reps  Holds 3-4 seconds With sEMG biofeedback:    QF's 4 x 5 reps in side-lying and seated. Holds 3 seconds x 10, 5 seconds x 10 With sEMG biofeedback:    QF's 4 x 5 reps in side-lying, seated, standing    Holds 5 sec seconds x 10 in seated, side-lying,  Standing   With digital feedback:     QF's 4 x 5 reps in supine      Coordinated contractions with breath    Holds 5 seconds x 10 in supine With sEMG biofeedback:    QF's 4 x 5 reps in standing    Holds 3-5 sec seconds in  Standing     Pelvic brace 3x/day x 10   Supine X 10 in supine, hook-lying  X 5 with march  X 5 with march + hip adduction 2 X 10 in supine, hook-lying  + hip adduction ( BLOCK) up/up/down/down   Supine 3 x 10      ( BLOCK removed) up/up/down/down 3 x 5 - progressed to heel drop Completed in standing   PF drops   x 10 with sEMG biofeedback  x 10 with sEMG biofeedback X 10 with sEMG biofeedback     HEP  Updated to add 4x5 QF and Brace + march in place Progressed to QF in seated position, 5 sec PF holds in supine/side-lying Progressed  Holds in supine x 5-7 sec, seated/standing x 3 sec x 10 reps  X 10 QF in each position  Reviewed - practice quick flicks in standing position   Side-lying hip abduction   2 x 10 against wall  dropped    Standing balance     Single leg balance with hip abduction 1 x 10 YTB    Tall kneeling hip flexor stretch     3 x 30 sec Edge of bed 2 x 1 min   Cat/cow-->Debora pose     2 x 5 reps    Single leg hip adduction stretch      1 min 2 x     Access Code: [email or web address]  URL: [email or web address]  Date: 04/25/2022  Prepared by: Kasey Tucson    Exercises  Quadruped Cat Camel - 2 x daily - 7 x weekly - 2 sets - 5 reps  Child's Pose with Sidebending - 2 x daily - 7 x weekly - 2 sets - 5 reps  Half Kneeling Hip Flexor Stretch - 2 x daily - 7 x weekly - 3 sets - 30 hold  Supine 90/90 Alternating Toe Touch - 1 x daily - 7 x weekly - 3 sets - 5 reps  Hip Abduction with Resistance Loop - 1 x daily - 7 x weekly - 10 reps    Access Code: [email or web address]  URL: [email or web address]  Date: 05/02/2022  Prepared by: Kasey Tucson    Exercises  Hip Flexor Stretch at [employer] of Bed - 1-2 x daily - 7 x weekly - 3 sets - 1 min hold  Supine Hip External Rotation - 1-2 x daily - 7 x weekly - 3 sets - 1 min hold      THERAPEUTIC ACTIVITY: (15 minutes): Functional activity education regarding anatomy, pathology and role of pelvic floor muscle (PFM) function in relation to presenting symptoms and role of pelvic floor therapy in conservative treatment., Extensive functional activity training on avoiding valsava/breath holding during strenuous activities as well as abdominal/pelvic bracing to provide muscular support and decrease symptoms of prolapse during lifting, sit to stand, getting in/out of car as well as log roll technique to decrease intraabdominal pressure. and Instruction on coordinated pelvic floor and diaphragmatic breathing to improve kinesthetic awareness of pelvic muscle mobility and restore proper motor recruitment patterns with breathing, posture, and functional movement (e.g. appropriate lift/contraction with increased IAP such as a cough, laugh, sneeze to prevent incontinence as well as appropriate relaxation/drop to reduce pain with vaginal penetration).      TA Educational Topic Notes Date Completed   Pathology/Anatomy/PFM Function Completed 3/24/22   Bladder health education     Urinary urge suppression Urge and bowel suppression strategies  Reviewed 4/4/22 4/25/22 5/2/22   The joaquim     Voiding strategies     Bowel/Bladder log     Bowel health education     Constipation care     Diarrhea/Fecal leakage     Colonic massage     Toilet positioning     Defecation dynamics     Sources of fiber     Return to intercourse/Dilator training     Sexual positioning     Lubricants/vaginal moisturizers     Vaginal irritants     Body mechanics Body mechanics, coordination of PF, TrA, and diaphragm  Sit-to-stand transtions 4/4/22 5/2/22   Posture/ergonomics Standing posture 5/2/22   Diaphragmatic breathing     Resources and technology         MANUAL THERAPY ( minutes): Held today    Treatment/Session Summary:    · Response to Treatment: Pt demonstrated ability to perform quick flicks and coordinated PFM contractions with exhalation in standing today. She reported poor confidence in her ability to complete urge suppression in standing. I also assessed her right hip due to catching present. She did report impingement in full hip flexion, but I was unable to reproduce her symptoms with tests listed above. · Communication/Consultation:  None today  · Equipment provided today:  None today  Recommendations/Intent for next treatment session: Next visit will focus on review urge suppression in standing, re-assess standing posture - use of mirror for visual feedback,  progress TrA to quadruped, continue lumbar mobility.     · Variation from POC: N/A  Total Treatment Billable Duration: Treatment minutes 55  PT Patient Time In/Time Out  Time In: 0105  Time Out: [address] [facility],      Future Appointments   Date Time Provider Samuel Alvarez   5/9/2022  1:00 PM Carla Barker PT [facility] [place]   5/16/2022  1:00 PM Carla Barker PT [facility] [place]

## 2022-05-03 PROCEDURE — 88312 SPECIAL STAINS GROUP 1: CPT

## 2022-05-03 PROCEDURE — 88305 TISSUE EXAM BY PATHOLOGIST: CPT

## 2022-05-04 ENCOUNTER — HOSPITAL ENCOUNTER (OUTPATIENT)
Dept: LAB | Age: 77
Discharge: HOME | End: 2022-05-04

## 2022-05-09 ENCOUNTER — APPOINTMENT (OUTPATIENT)
Dept: PHYSICAL THERAPY | Age: 77
End: 2022-05-09
Attending: NURSE PRACTITIONER
Payer: MEDICARE

## 2022-05-11 ENCOUNTER — HOSPITAL ENCOUNTER (OUTPATIENT)
Dept: PHYSICAL THERAPY | Age: 77
Discharge: HOME | End: 2022-05-11
Attending: NURSE PRACTITIONER
Payer: MEDICARE

## 2022-05-11 PROCEDURE — 97110 THERAPEUTIC EXERCISES: CPT

## 2022-05-11 NOTE — PROGRESS NOTES
Richard Wilson  : 1945  [place]  [place]: [place] [address] at [place] [id number] [facility] [address] [place] [address],[address], [facility]  [phone]   [phone]         OUTPATIENT PHYSICAL THERAPY: Daily Treatment Note 2022   Visit Count:  7  ICD-10: Treatment Diagnosis: Lack of coordination (muscle incoordination) (R27.8), Pelvic Muscle Wasting (N81.84), Cystocele, unspecified (Prolapse of (anterior) vaginal wall NOS) (N81.10) and Rectocele (Prolapse of posterior vaginal wall) (N81.6)  Precautions/Allergies:   Demerol [meperidine]   TREATMENT PLAN:  Effective Dates: 3/24/2022 TO 2022 (90 days). Frequency/Duration: 1 time a week for 90 Day(s)  Pre-treatment Symptoms/Complaints:    1. Rectocele > Cystocele - UFI, UUI  2. Right sided lower abdominal discomfort  3. Left posterior hip pain (intermittent). Previously treated. Pt states improved control making it to restroom. She also notices less catching in her hip with transitions sit-to-stand. Avoiding deep chairs at this time. Improved ability to perform contractions in standing. Pain: Initial:   2/10  Posterior/hip back pain Post Session:  1/10   Medications Last Reviewed:  2022  Updated Objective Findings:    Internal PFM assessment: (via vaginal canal) 3/5. Mild delay in relaxation following contractions. Quick flicks: 6 in 10 seconds (sEMG biofeedback). Max activation >30 mV in supine and to 10 mV in standing. Resting PFM activity <1.5 mV in supine and standing. sEMG biofeedback (22)    Hip impingement testing:  FADIR: positive  LAINEY: negative  SLR: negative    Lumbar spine: No reproduction of anterior hip pain with flexion, extension, side-bending, or rotational movements. TREATMENT:   THERAPEUTIC EXERCISE: (55 minutes):  Exercises per grid below to improve strength and coordination.   Required minimal verbal cues to promote proper body mechanics and promote proper body breathing techniques. Progressed repetitions and complexity of movement as indicated. TE= therapeutic exercise, TA= therapeutic activity, MT= manual therapy, NE= neuro re-education   Date:  3/24/22 Date:  4/4/22 Date:  4/12/22 Date:  4/19/22 Date:  4/25/22 Date:  5/2/22 Date:  5/11/22   Activity/Exercise Parameters Parameters Parameters Parameters Parameters Parameters Parameters   Kegals 3x/day x 10 reps  3 sec holds in SUPINE With sEMG biofeedback:    QF's 4 x 5 reps  Holds 3-4 seconds With sEMG biofeedback:    QF's 4 x 5 reps in side-lying and seated.     Holds 3 seconds x 10, 5 seconds x 10 With sEMG biofeedback:    QF's 4 x 5 reps in side-lying, seated, standing    Holds 5 sec seconds x 10 in seated, side-lying,  Standing   With digital feedback:     QF's 4 x 5 reps in supine      Coordinated contractions with breath    Holds 5 seconds x 10 in supine With sEMG biofeedback:    QF's 4 x 5 reps in standing    Holds 3-5 sec seconds in  Standing   With sEMG biofeedback:    QF's 4 x 5 reps in standing, supine    Holds 3-5 sec seconds in  Standing    Facilitated with hip adduction/ball squeeze 2 x 10   Pelvic brace 3x/day x 10   Supine X 10 in supine, hook-lying  X 5 with march  X 5 with march + hip adduction 2 X 10 in supine, hook-lying  + hip adduction ( BLOCK) up/up/down/down   Supine 3 x 10      ( BLOCK removed) up/up/down/down 3 x 5 - progressed to heel drop Completed in standing Quadruped UE flexion, LE extension   PF drops   x 10 with sEMG biofeedback  x 10 with sEMG biofeedback X 10 with sEMG biofeedback      HEP  Updated to add 4x5 QF and Brace + march in place Progressed to QF in seated position, 5 sec PF holds in supine/side-lying Progressed  Holds in supine x 5-7 sec, seated/standing x 3 sec x 10 reps  X 10 QF in each position  Reviewed - practice quick flicks in standing position Reviewed/updated plan - see below   Side-lying hip abduction   2 x 10 against wall  dropped     Standing balance     Single leg balance with hip abduction 1 x 10 YTB  Side-stepping resisted - RTB 2 x 10   Tall kneeling hip flexor stretch     3 x 30 sec Edge of bed 2 x 1 min    Cat/cow-->Debora pose     2 x 5 reps  Debora pose   Single leg hip adduction stretch      1 min 2 x Reviewed    Bird dog       X 10 x 5 sec     Access Code: [email or web address]  URL: [email or web address]  Date: 05/11/2022  Prepared by: [facility]    Exercises  Bridge - 1 x daily - 3 x weekly - 3 sets - 10 reps  Side Stepping with Resistance at Ankles and Counter Support - 1 x daily - 3 x weekly - 2 sets - 10 reps  Bird Dog - 1 x daily - 3 x weekly - 1 sets - 10 reps - 5 hold  Supine 90/90 Alternating Toe Touch - 1 x daily - 3 x weekly - 2 sets - 10 reps  Child's Pose with Sidebending - 1 x daily - 7 x weekly - 3 sets - 1 min hold  Hip Flexor Stretch at Edge of Bed - 1 x daily - 7 x weekly - 3 sets - 1 min hold  Supine Hip External Rotation - 1 x daily - 7 x weekly - 3 sets - 1 min hold        THERAPEUTIC ACTIVITY: ( minutes): Functional activity education regarding anatomy, pathology and role of pelvic floor muscle (PFM) function in relation to presenting symptoms and role of pelvic floor therapy in conservative treatment., Extensive functional activity training on avoiding valsava/breath holding during strenuous activities as well as abdominal/pelvic bracing to provide muscular support and decrease symptoms of prolapse during lifting, sit to stand, getting in/out of car as well as log roll technique to decrease intraabdominal pressure.  and Instruction on coordinated pelvic floor and diaphragmatic breathing to improve kinesthetic awareness of pelvic muscle mobility and restore proper motor recruitment patterns with breathing, posture, and functional movement (e.g. appropriate lift/contraction with increased IAP such as a cough, laugh, sneeze to prevent incontinence as well as appropriate relaxation/drop to reduce pain with vaginal penetration). TA Educational Topic Notes Date Completed   Pathology/Anatomy/PFM Function Completed 3/24/22   Bladder health education     Urinary urge suppression Urge and bowel suppression strategies  Reviewed 4/4/22 4/25/22 5/2/22   The joaquim     Voiding strategies     Bowel/Bladder log     Bowel health education     Constipation care     Diarrhea/Fecal leakage     Colonic massage     Toilet positioning     Defecation dynamics     Sources of fiber     Return to intercourse/Dilator training     Sexual positioning     Lubricants/vaginal moisturizers     Vaginal irritants     Body mechanics Body mechanics, coordination of PF, TrA, and diaphragm  Sit-to-stand transtions 4/4/22 5/2/22   Posture/ergonomics Standing posture 5/2/22   Diaphragmatic breathing     Resources and technology         MANUAL THERAPY ( minutes): Held today    Treatment/Session Summary:    · Response to Treatment: Pt showing good coordination of kegals in supine and standing position, resulting in improved urinary control per patient report. Fatigued quickly with resisted side-stepping. She will continue to benefit from hip and core strengthening to some instability in quadruped and standing. · Communication/Consultation:  None today  · Equipment provided today:  None today   · Variation from POC: Pt out of town next week as she will be in [facility]. F/u in 2 weeks.    Recommendations/Intent for next treatment session: Next visit will focus on review HEP    · Variation from POC: N/A  Total Treatment Billable Duration: Treatment minutes 55  PT Patient Time In/Time Out  Time In: 0803  Time Out: 0900  Shemar Lemons PT     Future Appointments   Date Time Provider Samuel Alvarez   5/16/2022  7:00 PM Panda Aguilar, PT [facility] [place]

## 2022-05-16 ENCOUNTER — HOSPITAL ENCOUNTER (OUTPATIENT)
Dept: PHYSICAL THERAPY | Age: 77
Discharge: HOME | End: 2022-05-16
Attending: NURSE PRACTITIONER
Payer: MEDICARE

## 2022-05-16 NOTE — PROGRESS NOTES
Kam Mccormick  : 1945  [place]  [place]: [place] [address] at [place] [id number] [facility], [address] [place] [address],[address], [facility] [address]  [phone]   [phone]          OUTPATIENT DAILY NOTE    NAME/AGE/GENDER: Kam Mccormick is a 78 y.o. female. DATE: 2022    Patient cancelled (less than 24 hours ago) her appointment for today due to pt being out of town. Will plan to follow up on next scheduled visit.     Holly Maki PT    Future Appointments   Date Time Provider Samuel Alvarez   2022  7:00 PM [facility]

## 2022-05-26 ENCOUNTER — HOSPITAL ENCOUNTER (OUTPATIENT)
Dept: PHYSICAL THERAPY | Age: 77
Setting detail: RECURRING SERIES
Discharge: HOME | End: 2022-05-29
Payer: MEDICARE

## 2022-05-26 PROCEDURE — 97110 THERAPEUTIC EXERCISES: CPT

## 2022-05-26 PROCEDURE — 97530 THERAPEUTIC ACTIVITIES: CPT

## 2022-05-26 NOTE — DISCHARGE SUMMARY
functional limitations):  1. Decreased Strength  2. Decreased ADL/Functional Activities  3. Increased Pain  4. Decreased Activity Tolerance  5. Decreased [place] with Home Exercise Program  6. Decreased strength of pelvic floor which limits bladder control INTERVENTIONS PLANNED: (Treatment may consist of any combination of the following)  1. Neuromuscular re-education  2. Biofeedback as needed  3. HEP  4. Bladder retraining  5. Bladder education  6. Manual Therapy  7. Range of Motion (ROM)  8. Therapeutic Activites  9. Therapeutic Exercise/Strengthening      GOALS: (Goals have been discussed and agreed upon with patient.)  Short-Term Functional Goals: Time Frame:4-6 weeks  1. Patient will demonstrate independence with basic pelvic floor muscle (PFM) home exercises program (HEP) to improve awareness, coordination, and timing of PFM. GOAL MET  2. Patient will demonstrate understanding of and ability to teach back appropriate water intake, bladder irritants, toileting frequency, and positioning for improved self-management of symptoms. GOAL MET  3. Patient will demonstrate ability to isolate a pelvic floor contraction without breath holding and minimal to no accessory muscle use in order to implement the knack and/or urge suppression, reducing pad usage by 100%. GOAL MET  Discharge Goals: Time Frame: 8 weeks  1. Patient will demonstrate ability to increase intra-abdominal pressure and eccentrically contract the pelvic floor muscles without breath holding, as assessed by digitally or with use of sEMG biofeedback, in order to improve bowel evacuation. 2. Patient will report a reduction in episodes of FI by 100%. 3. Patient will improve outcome score by >12%. 4. Patient will be able to perform lifting, bending, transitioning without urinary leakage for improved participation in recreational activities and ADL's.   GOAL MET     OUTCOME MEASURE:   Tool Used: Pelvic Floor Impact Questionnaire--short form 7 (PFIQ-7) Compressor Urethra []? Right  []? Left  []? DNT Pubococcygeus []? Right  []? Left  []? DNT   Bulbocavernosus []? Right  []? Left  []? DNT     Iliococcygeus []? Right  []? Left  []? DNT           Obturator Internus []? Right  []? Left  []? DNT           Coccygeus []? Right  []? Left  []? DNT        Range of motion:    Left Right   Hip flexion       Hip extension        Knee flexion        Knee extension       Hip internal rotation        Hip external rotation        Hip abduction       Hip adduction              External palpation:  Muscle/muscle group Tender? Adductors []? Right  []? Left  []? DNT   Gluteals []? Right  []? Left  []? DNT   Piriformis []? Right  []? Left  []? DNT   Iliopsoas []? Right  []? Left  []? DNT   Abdominal wall []? Right  []? Left  []? DNT   Pubic symphysis []? Right  []? Left  []? DNT      Breath assessment:   Observation: AP and ML chest expansion  Coordination of pelvic floor muscles with breath: moderate pelvic floor muscle excursion  Co-contraction of PFM with transversus abdominis: Present absent.      Candy cortes, PT

## 2022-05-26 NOTE — PROGRESS NOTES
Reji Huerta  : 1945  Primary: Medicare Part [place] B  Secondary: [place] @ [address] [address]  Phone: [phone]  Fax: [phone] No data recorded  No data recorded    PT Visit Info:    No data recorded    OUTPATIENT PHYSICAL THERAPY:OP NOTE TYPE: Treatment Note 2022       Episode   Appt Desk       Treatment Diagnosis:  Lack of coordination (muscle incoordination) (R27.8)  Stress incontinence (female) (male) (N39.3)  Cystocele, unspecified (Prolapse of (anterior) vaginal wall NOS) (N81.10)  Dyspareunia (N94.1)  Pain in left hip (M25.552)  Medical/Referring Diagnosis:  Rectocele [N81.6]  Referring Physician:  PERRI Clifford -*  MD Orders:  PT Eval and Treat   Date of Onset:  No data recorded   Allergies:  Meperidine  Restrictions/Precautions:    No data recordedNo data recorded   Interventions Planned (Treatment may consist of any combination of the following):    No data recorded   Subjective Comments:  Pt states she is having success with bladder control. Pads 0/day. Pt reports her exercises are going well. Initial:      0/10 Post Session:      0/10  Medications Last Reviewed:  2022  Updated Objective Findings:   See discharge summary 22    Treatment       THERAPEUTIC EXERCISE: (40 minutes):  Exercises per grid below to improve strength and coordination. Required minimal verbal cues to promote proper body mechanics and promote proper body breathing techniques.   Progressed repetitions and complexity of movement as indicated.     TE= therapeutic exercise, TA= therapeutic activity, MT= manual therapy, NE= neuro re-education    Date:  3/24/22 Date:  22 Date:  22 Date:  22 Date:  22 Date:  22 Date:  22 Date:  22   Activity/Exercise Parameters Parameters Parameters Parameters Parameters Parameters Parameters Parameters   Kegals 3x/day x 10 reps  3 sec holds in SUPINE With sEMG biofeedback:     QF's 4 x 5 reps  Holds 3-4 seconds With sEMG biofeedback:     QF's 4 x 5 reps in side-lying and seated.     Holds 3 seconds x 10, 5 seconds x 10 With sEMG biofeedback:     QF's 4 x 5 reps in side-lying, seated, standing     Holds 5 sec seconds x 10 in seated, side-lying,  Standing    With digital feedback:      QF's 4 x 5 reps in supine        Coordinated contractions with breath     Holds 5 seconds x 10 in supine With sEMG biofeedback:     QF's 4 x 5 reps in standing     Holds 3-5 sec seconds in  Standing    With sEMG biofeedback:     QF's 4 x 5 reps in standing, supine     Holds 3-5 sec seconds in  Standing     Facilitated with hip adduction/ball squeeze 2 x 10 With digital feedback  - QF's, holds, compound contractions in supine position    Pelvic brace 3x/day x 10   Supine X 10 in supine, hook-lying  X 5 with march  X 5 with march + hip adduction 2 X 10 in supine, hook-lying  + hip adduction ( BLOCK) up/up/down/down    Supine 3 x 10       ( BLOCK removed) up/up/down/down 3 x 5 - progressed to heel drop Completed in standing Quadruped UE flexion, LE extension Quadruped UE flexion, LE extension    EO/EC    Heel drop   PF drops    x 10 with sEMG biofeedback  x 10 with sEMG biofeedback X 10 with sEMG biofeedback          HEP   Updated to add 4x5 QF and Brace + march in place Progressed to QF in seated position, 5 sec PF holds in supine/side-lying Progressed  Holds in supine x 5-7 sec, seated/standing x 3 sec x 10 reps  X 10 QF in each position   Reviewed - practice quick flicks in standing position Reviewed/updated plan - see below Reviewed for discharge - frequency, exercise type, weaning process   Side-lying hip abduction     2 x 10 against wall   dropped        Standing balance         Single leg balance with hip abduction 1 x 10 YTB   Side-stepping resisted - RTB 2 x 10 Side-stepping resisted - YTB 2 x 10   Tall kneeling hip flexor stretch         3 x 30 sec Edge of bed 2 x 1 min   Cat/cow-->Alysia pose         2 x 5 reps   Alysia pose    Single leg hip adduction stretch           1 min 2 x Reviewed     Bird dog             X 10 x 5 sec       Access Code: [email or web address]  URL: [email or web address]  Date: 05/11/2022  Prepared by: [facility]     Exercises  Bridge - 1 x daily - 3 x weekly - 3 sets - 10 reps  Side Stepping with Resistance at Ankles and Counter Support - 1 x daily - 3 x weekly - 2 sets - 10 reps  Bird Dog - 1 x daily - 3 x weekly - 1 sets - 10 reps - 5 hold  Supine 90/90 Alternating Toe Touch - 1 x daily - 3 x weekly - 2 sets - 10 reps  Child's Pose with Sidebending - 1 x daily - 7 x weekly - 3 sets - 1 min hold  Hip Flexor Stretch at Edge of Bed - 1 x daily - 7 x weekly - 3 sets - 1 min hold  Supine Hip External Rotation - 1 x daily - 7 x weekly - 3 sets - 1 min hold           THERAPEUTIC ACTIVITY: (15 minutes): Functional activity education regarding anatomy, pathology and role of pelvic floor muscle (PFM) function in relation to presenting symptoms and role of pelvic floor therapy in conservative treatment., Extensive functional activity training on avoiding valsava/breath holding during strenuous activities as well as abdominal/pelvic bracing to provide muscular support and decrease symptoms of prolapse during lifting, sit to stand, getting in/out of car as well as log roll technique to decrease intraabdominal pressure.  and Instruction on coordinated pelvic floor and diaphragmatic breathing to improve kinesthetic awareness of pelvic muscle mobility and restore proper motor recruitment patterns with breathing, posture, and functional movement (e.g. appropriate lift/contraction with increased IAP such as a cough, laugh, sneeze to prevent incontinence as well as appropriate relaxation/drop to reduce pain with vaginal penetration).      TA Educational Topic Notes Date Completed   Pathology/Anatomy/PFM Function Completed 3/24/22   Bladder health education       Urinary urge suppression Urge and bowel suppression strategies  Reviewed 4/4/22 4/25/22 5/2/22 5/26/22   The sebastian       Voiding strategies       Bowel/Bladder log       Bowel health education       Constipation care       Diarrhea/Fecal leakage       Colonic massage       Toilet positioning       Defecation dynamics       Sources of fiber       Return to intercourse/Dilator training       Sexual positioning       Lubricants/vaginal moisturizers       Vaginal irritants       Body mechanics Body mechanics, coordination of PF, TrA, and diaphragm  Sit-to-stand transtions  Lifting mechanics 4/4/22 5/2/22 5/26/22   Posture/ergonomics Standing posture 5/2/22   Diaphragmatic breathing       Resources and technology             MANUAL THERAPY ( minutes): Held today       Treatment/Session Summary:    Treatment Assessment: See discharge summary 5/26/22     · Communication/Consultation:  None today  · Equipment provided today:  None  · Recommendations/Intent for next treatment session: Next visit will focus on Discharge with HEP.     Total Treatment Billable Duration:  55 minutes  Time In: 0310  Time Out: 0405    Anne Marie Alegre, PT       Post Session Pain  Charge Capture  [employer] Portal  MD Guidelines  Scanned Media  Benefits  MyChart

## 2023-02-08 ENCOUNTER — OFFICE VISIT (OUTPATIENT)
Dept: OBGYN CLINIC | Age: 78
End: 2023-02-08

## 2023-02-08 VITALS
BODY MASS INDEX: 23.75 KG/M2 | DIASTOLIC BLOOD PRESSURE: 74 MMHG | HEIGHT: 60 IN | SYSTOLIC BLOOD PRESSURE: 118 MMHG | WEIGHT: 121 LBS

## 2023-02-08 DIAGNOSIS — N89.8 VAGINAL ITCHING: Primary | ICD-10-CM

## 2023-02-08 DIAGNOSIS — N95.2 VAGINAL ATROPHY: ICD-10-CM

## 2023-02-08 LAB
BACTERIA, WET MOUNT, POC: NORMAL
CLUE CELLS, WET MOUNT, POC: NORMAL
RBC WET MOUNT, POC: NORMAL
TRICH, WET MOUNT, POC: NORMAL
WBC, WET MOUNT, POC: NORMAL
YEAST, WET MOUNT, POC: NORMAL

## 2023-02-08 RX ORDER — FAMOTIDINE 10 MG
10 TABLET ORAL 2 TIMES DAILY
COMMUNITY

## 2023-02-08 RX ORDER — ESTRADIOL 0.1 MG/G
CREAM VAGINAL
Qty: 30 EACH | Refills: 5 | Status: SHIPPED | OUTPATIENT
Start: 2023-02-08

## 2023-02-08 RX ORDER — METRONIDAZOLE 500 MG/1
500 TABLET ORAL 2 TIMES DAILY
Qty: 14 TABLET | Refills: 0 | Status: SHIPPED | OUTPATIENT
Start: 2023-02-08 | End: 2023-02-15

## 2023-02-08 NOTE — PROGRESS NOTES
Abelino Gee  is a 78 y.o. female, No obstetric history on file. .  No LMP recorded. Patient has had an ablation. , who is being seen for perineal itching and would like to have prolapse assessed. Perineal itching has been present for several weeks. Denies discharge or odor. She has another likely hemangioma on her bottom. She is trying to remember to use vaginal estrogen but forgets to use it. She denies uti or issues emptying her bladder. She occ feels like it is hard to express a bowel movement. HISTORY:    OB History          3    Para        Term   3            AB        Living   3         SAB        IAB        Ectopic        Molar        Multiple        Live Births                  GYN History         Sexual History:   Social History     Substance and Sexual Activity   Sexual Activity Not on file    Comment: tubal ligation          has a past medical history of High cholesterol, Hypertension, and Osteoporosis.  .    Past Surgical History:   Procedure Laterality Date    CARPAL TUNNEL RELEASE      right wrist     ENDOMETRIAL ABLATION      MYOMECTOMY      TUBAL LIGATION      WISDOM TOOTH EXTRACTION         Her current meds are:    Current Outpatient Medications:     famotidine (PEPCID) 10 MG tablet, Take 10 mg by mouth 2 times daily, Disp: , Rfl:     metroNIDAZOLE (FLAGYL) 500 MG tablet, Take 1 tablet by mouth in the morning and at bedtime for 7 days, Disp: 14 tablet, Rfl: 0    estradiol (ESTRACE) 0.1 MG/GM vaginal cream, Apply 3 nights weekly and to affected area., Disp: 30 each, Rfl: 5    triamcinolone (KENALOG) 0.1 % ointment, Apply to affected area., Disp: 15 g, Rfl: 2    aspirin 81 MG chewable tablet, Take 81 mg by mouth daily, Disp: , Rfl:     Cholecalciferol 50 MCG (2000) TABS, Take by mouth, Disp: , Rfl:     FLUoxetine (PROZAC) 10 MG capsule, Take 10 mg by mouth daily, Disp: , Rfl:     metoprolol succinate (TOPROL XL) 50 MG extended release tablet, Take 50 mg by mouth daily, Disp: , Rfl:     simvastatin (ZOCOR) 10 MG tablet, Take by mouth, Disp: , Rfl:      Review of Systems  Neg except hpi     PHYSICAL EXAM:  /74   Ht 5' (1.524 m)   Wt 121 lb (54.9 kg)   BMI 23.63 kg/m²   Physical Exam  General: well nourished well developed female in nad   Pelvic:  external vulva with skin tags present  - in form of hemangiomas. Small polyp at urethra. Atrophy of labia noted. Thin tissue noted a short perineal body. Speculum inserted with cervix seen. Mod amount of white discharge. Speculum broken down and with valsalva - no leakage or urine but moderate cystocele seen. Small rectocele seen. Wet prep with clue cells seen. ASSESSMENT / PLAN:  Diagnoses and all orders for this visit:    Vaginal itching  -     AMB POC SMEAR, STAIN & INTERPRET, WET MOUNT SC    Vaginal atrophy    Other orders  -     metroNIDAZOLE (FLAGYL) 500 MG tablet; Take 1 tablet by mouth in the morning and at bedtime for 7 days  -     estradiol (ESTRACE) 0.1 MG/GM vaginal cream; Apply 3 nights weekly and to affected area. -     triamcinolone (KENALOG) 0.1 % ointment; Apply to affected area. Bv seen and likely from ph change. Will tx with flagyl. Use vaginal estrogen 0.5 grams three nights per week. Apply yovana sized amount to medial labia and perineal body same nights. Triamcinolone ointment prn for itching.       Padmini Jonas, DO

## 2023-03-13 RX ORDER — ESTRADIOL 0.1 MG/G
CREAM VAGINAL
Qty: 42.5 G | Refills: 2 | OUTPATIENT
Start: 2023-03-13

## 2023-11-21 NOTE — PROGRESS NOTES
[facility] CARDIOLOGY  [address] [facility], [address]  PHONE: [phone]      23    NAME:  Reshma Moreno  : 1945  MRN: [id number]         SUBJECTIVE:   Reshma Moreno is a 70 y.o. female seen for a follow up visit regarding the following:     Chief Complaint   Patient presents with    Palpitations            HPI:  Follow up  Palpitations   . Patient requests to re establish cardiology care. Follow up palpitations, dyspnea, brief PSVT controlled on BB. She had been having some discomfort. Her PCP ordered a nuclear stress test at [facility] which was normal.  She also had gotten concerned because she saw an EKG report that mentioned some things that raised her concern, but her EKG looks well. She has a chronic cough and some intermittent tightness that occurs randomly, may last several hours. Takes nothing for it. She has a diagnosis of chronic GERD, her cough has been present for years, it did seem to improve with a course of omeprazole. She is exercising, walks at least 2 miles most days, involves hills, has no symptoms with her exercise. Past cardiac history:   2017 - stress MPI at [facility] said to be abnormal EKG with normal perfusion imaging, EF 69%, excellent fitness. Mar 2020 - normal SF and DF, RVSP 25    Normal perfusion study   30 day monitor-1 brief episode PSVT, none after initiation of metoprolol            Key CAD CHF Meds            metoprolol succinate (TOPROL XL) 50 MG extended release tablet (Taking)    Class: Historical Med    simvastatin (ZOCOR) 10 MG tablet (Taking)    Class: Historical Med          Key Antihyperglycemic Medications       Patient is on no antihyperglycemic meds. Past Medical History, Past Surgical History, Family history, Social History, and Medications were all reviewed with the patient today and updated as necessary. Prior to Admission medications    Medication Sig Start Date End Date Taking?  Authorizing

## 2023-11-22 ENCOUNTER — OFFICE VISIT (OUTPATIENT)
Age: 78
End: 2023-11-22

## 2023-11-22 VITALS
BODY MASS INDEX: 23.36 KG/M2 | HEIGHT: 60 IN | DIASTOLIC BLOOD PRESSURE: 68 MMHG | SYSTOLIC BLOOD PRESSURE: 150 MMHG | WEIGHT: 119 LBS | HEART RATE: 53 BPM

## 2023-11-22 DIAGNOSIS — R00.2 PALPITATIONS: Primary | ICD-10-CM

## 2023-11-22 DIAGNOSIS — I47.10 PSVT (PAROXYSMAL SUPRAVENTRICULAR TACHYCARDIA): ICD-10-CM

## 2023-11-22 RX ORDER — METOPROLOL SUCCINATE 50 MG/1
50 TABLET, EXTENDED RELEASE ORAL DAILY
Qty: 90 TABLET | Refills: 3 | Status: CANCELLED | OUTPATIENT
Start: 2023-11-22

## 2023-11-22 RX ORDER — SIMVASTATIN 10 MG
TABLET ORAL
Qty: 90 TABLET | Refills: 3 | Status: CANCELLED | OUTPATIENT
Start: 2023-11-22

## 2023-11-22 ASSESSMENT — ENCOUNTER SYMPTOMS
ABDOMINAL PAIN: 1
SHORTNESS OF BREATH: 0